# Patient Record
Sex: FEMALE | Race: OTHER | Employment: FULL TIME | URBAN - METROPOLITAN AREA
[De-identification: names, ages, dates, MRNs, and addresses within clinical notes are randomized per-mention and may not be internally consistent; named-entity substitution may affect disease eponyms.]

---

## 2025-05-07 ENCOUNTER — OFFICE VISIT (OUTPATIENT)
Dept: FAMILY MEDICINE CLINIC | Facility: CLINIC | Age: 30
End: 2025-05-07
Payer: COMMERCIAL

## 2025-05-07 VITALS
DIASTOLIC BLOOD PRESSURE: 68 MMHG | HEART RATE: 78 BPM | OXYGEN SATURATION: 98 % | BODY MASS INDEX: 32.85 KG/M2 | HEIGHT: 61 IN | TEMPERATURE: 97.9 F | RESPIRATION RATE: 14 BRPM | SYSTOLIC BLOOD PRESSURE: 118 MMHG | WEIGHT: 174 LBS

## 2025-05-07 DIAGNOSIS — Z00.01 ENCOUNTER FOR GENERAL ADULT MEDICAL EXAMINATION WITH ABNORMAL FINDINGS: Primary | ICD-10-CM

## 2025-05-07 DIAGNOSIS — E28.2 PCOS (POLYCYSTIC OVARIAN SYNDROME): ICD-10-CM

## 2025-05-07 DIAGNOSIS — E04.1 THYROID NODULE: ICD-10-CM

## 2025-05-07 DIAGNOSIS — R73.09 ELEVATED HEMOGLOBIN A1C: ICD-10-CM

## 2025-05-07 PROCEDURE — 93000 ELECTROCARDIOGRAM COMPLETE: CPT | Performed by: INTERNAL MEDICINE

## 2025-05-07 PROCEDURE — 99395 PREV VISIT EST AGE 18-39: CPT | Performed by: INTERNAL MEDICINE

## 2025-05-07 PROCEDURE — 99214 OFFICE O/P EST MOD 30 MIN: CPT | Performed by: INTERNAL MEDICINE

## 2025-05-07 RX ORDER — HYDROXYZINE HYDROCHLORIDE 10 MG/1
10 TABLET, FILM COATED ORAL 3 TIMES DAILY PRN
COMMUNITY
Start: 2025-04-27 | End: 2025-05-07

## 2025-05-07 RX ORDER — AMOXICILLIN 500 MG/1
500 CAPSULE ORAL 2 TIMES DAILY
COMMUNITY
Start: 2025-05-05 | End: 2025-05-15

## 2025-05-07 NOTE — PROGRESS NOTES
Name: Rach Bejarano      : 1995      MRN: [id number]  Encounter Provider: Byron Humphrey MD  Encounter Date: 2025   Encounter department: [facility]  :  Assessment & Plan  Elevated hemoglobin A1c    Orders:    POCT ECG    TSH, 3rd generation; Future    T4, free; Future    Thyroid Antibodies Panel; Future    Quantiferon TB Gold Plus Assay; Future    H. pylori antigen, stool; Future    Thyroid nodule    Orders:    POCT ECG    TSH, 3rd generation; Future    T4, free; Future    Thyroid Antibodies Panel; Future    US thyroid; Future    Quantiferon TB Gold Plus Assay; Future    H. pylori antigen, stool; Future    Encounter for general adult medical examination with abnormal findings  Done in detail  Life style mod  RTC in 2-3 mos w :  Orders:    Quantiferon TB Gold Plus Assay; Future    H. pylori antigen, stool; Future    Iron Panel (Includes Ferritin, Iron Sat%, Iron, and TIBC); Future    Ferritin; Future    UA (URINE) with reflex to Scope; Future    Magnesium; Future    Vitamin B12; Future    Vitamin D 25 hydroxy; Future    CBC and differential; Future    Comprehensive metabolic panel; Future    Lipid panel; Future    PCOS (polycystic ovarian syndrome)  Start; Metformin...  RTC in 2-3 mos w :  Orders:    Quantiferon TB Gold Plus Assay; Future    H. pylori antigen, stool; Future    Iron Panel (Includes Ferritin, Iron Sat%, Iron, and TIBC); Future    Ferritin; Future    UA (URINE) with reflex to Scope; Future    metFORMIN (GLUCOPHAGE) 500 mg tablet; Take 1 tablet (500 mg total) by mouth 2 (two) times a day with meals    BMI 32.0-32.9,adult  Life style mod  Start :  Orders:    metFORMIN (GLUCOPHAGE) 500 mg tablet; Take 1 tablet (500 mg total) by mouth 2 (two) times a day with meals           History of Present Illness   29 Y O Lady is here for Annual Physical exam and Regular Check up , recent Blood work and med list reviewed,....      Review of Systems   Constitutional:   "Negative for chills, fatigue and fever.   HENT:  Negative for congestion, ear pain, facial swelling, sore throat, trouble swallowing and voice change.    Eyes:  Negative for pain, discharge and visual disturbance.   Respiratory:  Negative for cough, shortness of breath and wheezing.    Cardiovascular:  Negative for chest pain, palpitations and leg swelling.   Gastrointestinal:  Negative for abdominal pain, blood in stool, constipation, diarrhea, nausea and vomiting.   Endocrine: Negative for polydipsia, polyphagia and polyuria.   Genitourinary:  Negative for difficulty urinating, dysuria, hematuria and urgency.   Musculoskeletal:  Negative for arthralgias, back pain and myalgias.   Skin:  Negative for color change and rash.   Neurological:  Negative for dizziness, tremors, seizures, syncope, weakness and headaches.   Hematological:  Negative for adenopathy. Does not bruise/bleed easily.   Psychiatric/Behavioral:  Negative for dysphoric mood, sleep disturbance and suicidal ideas.    All other systems reviewed and are negative.      Objective   /68 (BP Location: Left arm, Patient Position: Sitting, Cuff Size: Standard)   Pulse 78   Temp 97.9 °F (36.6 °C) (Tympanic)   Resp 14   Ht 5' 1\" (1.549 m)   Wt 78.9 kg (174 lb)   SpO2 98%   BMI 32.88 kg/m²      Physical Exam  Vitals and nursing note reviewed.   Constitutional:       General: She is not in acute distress.     Appearance: She is well-developed. She is not diaphoretic.   HENT:      Head: Normocephalic and atraumatic.      Right Ear: External ear normal.      Left Ear: External ear normal.      Nose: Nose normal.   Eyes:      General:         Right eye: No discharge.         Left eye: No discharge.      Conjunctiva/sclera: Conjunctivae normal.      Pupils: Pupils are equal, round, and reactive to light.   Neck:      Thyroid: No thyromegaly.      Trachea: No tracheal deviation.   Cardiovascular:      Rate and Rhythm: Normal rate and regular rhythm.      " Heart sounds: Normal heart sounds. No murmur heard.     No friction rub.   Pulmonary:      Effort: Pulmonary effort is normal. No respiratory distress.      Breath sounds: Normal breath sounds. No stridor. No wheezing or rales.   Abdominal:      General: Bowel sounds are normal. There is no distension.      Palpations: Abdomen is soft.      Tenderness: There is no abdominal tenderness. There is no guarding.   Musculoskeletal:         General: No swelling, tenderness or deformity. Normal range of motion.      Cervical back: Normal range of motion and neck supple.   Lymphadenopathy:      Cervical: No cervical adenopathy.   Skin:     General: Skin is warm and dry.      Capillary Refill: Capillary refill takes less than 2 seconds.      Coloration: Skin is not pale.      Findings: No erythema or rash.   Neurological:      Mental Status: She is alert and oriented to person, place, and time.      Cranial Nerves: No cranial nerve deficit.      Coordination: Coordination normal.   Psychiatric:         Mood and Affect: Mood normal.         Behavior: Behavior normal.